# Patient Record
Sex: MALE | Race: BLACK OR AFRICAN AMERICAN | Employment: FULL TIME | URBAN - METROPOLITAN AREA
[De-identification: names, ages, dates, MRNs, and addresses within clinical notes are randomized per-mention and may not be internally consistent; named-entity substitution may affect disease eponyms.]

---

## 2019-05-11 ENCOUNTER — HOSPITAL ENCOUNTER (EMERGENCY)
Age: 24
Discharge: HOME | End: 2019-05-11
Attending: EMERGENCY MEDICINE
Payer: SELF-PAY

## 2019-05-11 VITALS
BODY MASS INDEX: 24.38 KG/M2 | SYSTOLIC BLOOD PRESSURE: 131 MMHG | WEIGHT: 180 LBS | DIASTOLIC BLOOD PRESSURE: 88 MMHG | RESPIRATION RATE: 15 BRPM | OXYGEN SATURATION: 100 % | HEART RATE: 92 BPM | TEMPERATURE: 99 F | HEIGHT: 72 IN

## 2019-05-11 DIAGNOSIS — K29.21 ACUTE ALCOHOLIC GASTRITIS WITH HEMORRHAGE: Primary | ICD-10-CM

## 2019-05-11 LAB
ALBUMIN SERPL-MCNC: 4.4 G/DL (ref 3.4–5)
ALBUMIN/GLOB SERPL: 1.1 {RATIO} (ref 0.8–1.7)
ALP SERPL-CCNC: 76 U/L (ref 45–117)
ALT SERPL-CCNC: 130 U/L (ref 16–61)
ANION GAP SERPL CALC-SCNC: 6 MMOL/L (ref 3–18)
AST SERPL-CCNC: 134 U/L (ref 15–37)
BASOPHILS # BLD: 0 K/UL (ref 0–0.1)
BASOPHILS NFR BLD: 0 % (ref 0–2)
BILIRUB SERPL-MCNC: 0.5 MG/DL (ref 0.2–1)
BUN SERPL-MCNC: 5 MG/DL (ref 7–18)
BUN/CREAT SERPL: 5 (ref 12–20)
CALCIUM SERPL-MCNC: 9.5 MG/DL (ref 8.5–10.1)
CHLORIDE SERPL-SCNC: 103 MMOL/L (ref 100–108)
CO2 SERPL-SCNC: 31 MMOL/L (ref 21–32)
CREAT SERPL-MCNC: 1.1 MG/DL (ref 0.6–1.3)
DIFFERENTIAL METHOD BLD: ABNORMAL
EOSINOPHIL # BLD: 0.2 K/UL (ref 0–0.4)
EOSINOPHIL NFR BLD: 2 % (ref 0–5)
ERYTHROCYTE [DISTWIDTH] IN BLOOD BY AUTOMATED COUNT: 16.5 % (ref 11.6–14.5)
GLOBULIN SER CALC-MCNC: 4.1 G/DL (ref 2–4)
GLUCOSE SERPL-MCNC: 93 MG/DL (ref 74–99)
HCT VFR BLD AUTO: 49.4 % (ref 36–48)
HGB BLD-MCNC: 17.3 G/DL (ref 13–16)
LIPASE SERPL-CCNC: 70 U/L (ref 73–393)
LYMPHOCYTES # BLD: 1.9 K/UL (ref 0.9–3.6)
LYMPHOCYTES NFR BLD: 21 % (ref 21–52)
MCH RBC QN AUTO: 32.8 PG (ref 24–34)
MCHC RBC AUTO-ENTMCNC: 35 G/DL (ref 31–37)
MCV RBC AUTO: 93.6 FL (ref 74–97)
MONOCYTES # BLD: 1.4 K/UL (ref 0.05–1.2)
MONOCYTES NFR BLD: 15 % (ref 3–10)
NEUTS SEG # BLD: 5.7 K/UL (ref 1.8–8)
NEUTS SEG NFR BLD: 62 % (ref 40–73)
PLATELET # BLD AUTO: 202 K/UL (ref 135–420)
PMV BLD AUTO: 9.3 FL (ref 9.2–11.8)
POTASSIUM SERPL-SCNC: 4.7 MMOL/L (ref 3.5–5.5)
PROT SERPL-MCNC: 8.5 G/DL (ref 6.4–8.2)
RBC # BLD AUTO: 5.28 M/UL (ref 4.7–5.5)
SODIUM SERPL-SCNC: 140 MMOL/L (ref 136–145)
WBC # BLD AUTO: 9.2 K/UL (ref 4.6–13.2)

## 2019-05-11 PROCEDURE — 80053 COMPREHEN METABOLIC PANEL: CPT

## 2019-05-11 PROCEDURE — 96375 TX/PRO/DX INJ NEW DRUG ADDON: CPT

## 2019-05-11 PROCEDURE — 85025 COMPLETE CBC W/AUTO DIFF WBC: CPT

## 2019-05-11 PROCEDURE — 83690 ASSAY OF LIPASE: CPT

## 2019-05-11 PROCEDURE — 96374 THER/PROPH/DIAG INJ IV PUSH: CPT

## 2019-05-11 PROCEDURE — C9113 INJ PANTOPRAZOLE SODIUM, VIA: HCPCS | Performed by: PHYSICIAN ASSISTANT

## 2019-05-11 PROCEDURE — 74011250636 HC RX REV CODE- 250/636: Performed by: PHYSICIAN ASSISTANT

## 2019-05-11 PROCEDURE — 99284 EMERGENCY DEPT VISIT MOD MDM: CPT

## 2019-05-11 PROCEDURE — 96361 HYDRATE IV INFUSION ADD-ON: CPT

## 2019-05-11 RX ORDER — OMEPRAZOLE 40 MG/1
40 CAPSULE, DELAYED RELEASE ORAL DAILY
Qty: 30 CAP | Refills: 0 | Status: SHIPPED | OUTPATIENT
Start: 2019-05-11 | End: 2019-08-23

## 2019-05-11 RX ORDER — DIPHENHYDRAMINE HYDROCHLORIDE 50 MG/ML
25 INJECTION, SOLUTION INTRAMUSCULAR; INTRAVENOUS
Status: COMPLETED | OUTPATIENT
Start: 2019-05-11 | End: 2019-05-11

## 2019-05-11 RX ORDER — PANTOPRAZOLE SODIUM 40 MG/10ML
40 INJECTION, POWDER, LYOPHILIZED, FOR SOLUTION INTRAVENOUS
Status: COMPLETED | OUTPATIENT
Start: 2019-05-11 | End: 2019-05-11

## 2019-05-11 RX ORDER — MORPHINE SULFATE 2 MG/ML
2 INJECTION, SOLUTION INTRAMUSCULAR; INTRAVENOUS ONCE
Status: COMPLETED | OUTPATIENT
Start: 2019-05-11 | End: 2019-05-11

## 2019-05-11 RX ORDER — METOCLOPRAMIDE HYDROCHLORIDE 5 MG/ML
5 INJECTION INTRAMUSCULAR; INTRAVENOUS
Status: COMPLETED | OUTPATIENT
Start: 2019-05-11 | End: 2019-05-11

## 2019-05-11 RX ADMIN — DIPHENHYDRAMINE HYDROCHLORIDE 25 MG: 50 INJECTION, SOLUTION INTRAMUSCULAR; INTRAVENOUS at 15:45

## 2019-05-11 RX ADMIN — PANTOPRAZOLE SODIUM 40 MG: 40 INJECTION, POWDER, FOR SOLUTION INTRAVENOUS at 15:43

## 2019-05-11 RX ADMIN — SODIUM CHLORIDE 1000 ML: 900 INJECTION, SOLUTION INTRAVENOUS at 15:42

## 2019-05-11 RX ADMIN — MORPHINE SULFATE 2 MG: 2 INJECTION, SOLUTION INTRAMUSCULAR; INTRAVENOUS at 15:45

## 2019-05-11 RX ADMIN — METOCLOPRAMIDE 5 MG: 5 INJECTION, SOLUTION INTRAMUSCULAR; INTRAVENOUS at 15:43

## 2019-05-11 NOTE — DISCHARGE INSTRUCTIONS
Patient Education        Gastritis: Care Instructions  Your Care Instructions    Gastritis is a sore and upset stomach. It happens when something irritates the stomach lining. Many things can cause it. These include an infection such as the flu or something you ate or drank. Medicines or a sore on the lining of the stomach (ulcer) also can cause it. Your belly may bloat and ache. You may belch, vomit, and feel sick to your stomach. You should be able to relieve the problem by taking medicine. And it may help to change your diet. If gastritis lasts, your doctor may prescribe medicine. Follow-up care is a key part of your treatment and safety. Be sure to make and go to all appointments, and call your doctor if you are having problems. It's also a good idea to know your test results and keep a list of the medicines you take. How can you care for yourself at home? · If your doctor prescribed antibiotics, take them as directed. Do not stop taking them just because you feel better. You need to take the full course of antibiotics. · Be safe with medicines. If your doctor prescribed medicine to decrease stomach acid, take it as directed. Call your doctor if you think you are having a problem with your medicine. · Do not take any other medicine, including over-the-counter pain relievers, without talking to your doctor first.  · If your doctor recommends over-the-counter medicine to reduce stomach acid, such as Pepcid AC, Prilosec, Tagamet HB, or Zantac 75, follow the directions on the label. · Drink plenty of fluids (enough so that your urine is light yellow or clear like water) to prevent dehydration. Choose water and other caffeine-free clear liquids. If you have kidney, heart, or liver disease and have to limit fluids, talk with your doctor before you increase the amount of fluids you drink. · Limit how much alcohol you drink. · Avoid coffee, tea, cola drinks, chocolate, and other foods with caffeine.  They increase stomach acid. When should you call for help? Call 911 anytime you think you may need emergency care. For example, call if:    · You vomit blood or what looks like coffee grounds.     · You pass maroon or very bloody stools.    Call your doctor now or seek immediate medical care if:    · You start breathing fast and have not produced urine in the last 8 hours.     · You cannot keep fluids down.    Watch closely for changes in your health, and be sure to contact your doctor if:    · You do not get better as expected. Where can you learn more? Go to http://nemo-rachna.info/. Enter [id number] in the search box to learn more about \"Gastritis: Care Instructions. \"  Current as of: March 27, 2018  Content Version: 11.9  © 9990-1832 [employer]. Care instructions adapted under license by [employer] (which disclaims liability or warranty for this information). If you have questions about a medical condition or this instruction, always ask your healthcare professional. [address] any warranty or liability for your use of this information.

## 2019-05-11 NOTE — ED PROVIDER NOTES
EMERGENCY DEPARTMENT HISTORY AND PHYSICAL EXAM 
 
Date: 5/11/2019 Patient Name: Chong Mulligan History of Presenting Illness Chief Complaint Patient presents with  Vomiting Blood  Headache History Provided By: Patient Chief Complaint: headache Duration: 5 Days Timing:  Gradual and Worsening Location: head, GI 
Quality: Sharp and Pressure Severity: Severe Modifying Factors: none Associated Symptoms: nausea, vomiting blood, headache, right sided neck pain HPI: Chong Mulligan is a 20 y.o. male with a PMH of hypertension who presents to the ER c/o headache, nausea, vomiting blood and right sided neck pain. Patient states his headache began about 5 days ago and continues to worsen. He tried taking 1 tylenol yesterday w/ no relief. He also reports onset of nausea and 2 episodes of vomiting bright red blood today. After the vomiting, he noted some right sided neck pain. He admits to drinking several beers and a few shots of liquor last night. He denied any recent fevers, sick contacts and has no other symptoms or complaints. PCP: Odalys Ybarra MD 
 
Current Outpatient Medications Medication Sig Dispense Refill  omeprazole (PRILOSEC) 40 mg capsule Take 1 Cap by mouth daily. 30 Cap 0 Past History Past Medical History: 
Past Medical History:  
Diagnosis Date  Hypertension Past Surgical History: 
History reviewed. No pertinent surgical history. Family History: 
History reviewed. No pertinent family history. Social History: 
Social History Tobacco Use  Smoking status: Never Smoker  Smokeless tobacco: Never Used Substance Use Topics  Alcohol use: Not on file  Drug use: Not on file Allergies: 
No Known Allergies Review of Systems Review of Systems Constitutional: Negative for chills, fatigue and fever. HENT: Negative. Negative for sore throat. Eyes: Negative. Negative for photophobia. Respiratory: Negative for cough and shortness of breath. Cardiovascular: Negative for chest pain and palpitations. Gastrointestinal: Positive for abdominal pain, nausea and vomiting. Vomiting bright red blood Genitourinary: Negative for dysuria. Musculoskeletal: Positive for neck pain. Skin: Negative. Neurological: Positive for headaches. Negative for dizziness, weakness and light-headedness. Psychiatric/Behavioral: Negative. All other systems reviewed and are negative. Physical Exam  
 
Vitals:  
 05/11/19 1600 05/11/19 1615 05/11/19 1630 05/11/19 1645 BP: 129/77 137/83 127/69 131/88 Pulse:      
Resp:      
Temp:      
SpO2: 100% 100% 100% 100% Weight:      
Height:      
 
Physical Exam  
Constitutional: He is oriented to person, place, and time. He appears well-developed and well-nourished. No distress. HENT:  
Head: Normocephalic and atraumatic. Mouth/Throat: Oropharynx is clear and moist.  
Eyes: Conjunctivae are normal. No scleral icterus. Neck: Neck supple. No JVD present. No tracheal deviation present. No Brudzinski's sign and no Kernig's sign noted. Cardiovascular: Normal rate, regular rhythm and normal heart sounds. No murmur heard. Pulmonary/Chest: Effort normal and breath sounds normal. No respiratory distress. He has no wheezes. He has no rales. He exhibits no tenderness. Abdominal: Soft. Bowel sounds are normal. He exhibits no distension. There is no tenderness. There is no rebound and no guarding. Musculoskeletal: Normal range of motion. Neurological: He is alert and oriented to person, place, and time. He has normal strength. Gait normal. GCS eye subscore is 4. GCS verbal subscore is 5. GCS motor subscore is 6. Skin: Skin is warm and dry. He is not diaphoretic. Psychiatric: He has a normal mood and affect. Nursing note and vitals reviewed. Diagnostic Study Results Labs - Recent Results (from the past 12 hour(s)) CBC WITH AUTOMATED DIFF Collection Time: 05/11/19  3:25 PM  
Result Value Ref Range WBC 9.2 4.6 - 13.2 K/uL  
 RBC 5.28 4.70 - 5.50 M/uL  
 HGB 17.3 (H) 13.0 - 16.0 g/dL HCT 49.4 (H) 36.0 - 48.0 % MCV 93.6 74.0 - 97.0 FL  
 MCH 32.8 24.0 - 34.0 PG  
 MCHC 35.0 31.0 - 37.0 g/dL  
 RDW 16.5 (H) 11.6 - 14.5 % PLATELET 592 702 - 580 K/uL MPV 9.3 9.2 - 11.8 FL  
 NEUTROPHILS 62 40 - 73 % LYMPHOCYTES 21 21 - 52 % MONOCYTES 15 (H) 3 - 10 % EOSINOPHILS 2 0 - 5 % BASOPHILS 0 0 - 2 %  
 ABS. NEUTROPHILS 5.7 1.8 - 8.0 K/UL  
 ABS. LYMPHOCYTES 1.9 0.9 - 3.6 K/UL  
 ABS. MONOCYTES 1.4 (H) 0.05 - 1.2 K/UL  
 ABS. EOSINOPHILS 0.2 0.0 - 0.4 K/UL  
 ABS. BASOPHILS 0.0 0.0 - 0.1 K/UL  
 DF AUTOMATED METABOLIC PANEL, COMPREHENSIVE Collection Time: 05/11/19  3:25 PM  
Result Value Ref Range Sodium 140 136 - 145 mmol/L Potassium 4.7 3.5 - 5.5 mmol/L Chloride 103 100 - 108 mmol/L  
 CO2 31 21 - 32 mmol/L Anion gap 6 3.0 - 18 mmol/L Glucose 93 74 - 99 mg/dL BUN 5 (L) 7.0 - 18 MG/DL Creatinine 1.10 0.6 - 1.3 MG/DL  
 BUN/Creatinine ratio 5 (L) 12 - 20 GFR est AA >60 >60 ml/min/1.73m2 GFR est non-AA >60 >60 ml/min/1.73m2 Calcium 9.5 8.5 - 10.1 MG/DL Bilirubin, total 0.5 0.2 - 1.0 MG/DL  
 ALT (SGPT) 130 (H) 16 - 61 U/L  
 AST (SGOT) 134 (H) 15 - 37 U/L Alk. phosphatase 76 45 - 117 U/L Protein, total 8.5 (H) 6.4 - 8.2 g/dL Albumin 4.4 3.4 - 5.0 g/dL Globulin 4.1 (H) 2.0 - 4.0 g/dL A-G Ratio 1.1 0.8 - 1.7 LIPASE Collection Time: 05/11/19  3:25 PM  
Result Value Ref Range Lipase 70 (L) 73 - 393 U/L Radiologic Studies - No orders to display CT Results  (Last 48 hours) None CXR Results  (Last 48 hours) None Medical Decision Making I am the first provider for this patient. I reviewed the vital signs, available nursing notes, past medical history, past surgical history, family history and social history. Vital Signs-Reviewed the patient's vital signs. Records Reviewed: Nursing Notes and Old Medical Records 3:42 PM 
19 y/o male who presents to the ER c/o headache x 5 days, neck pain that began today after vomiting. Also reports N/V that began today. 2 episodes of emesis w/ bright red blood. Admits to moderate to heavy alcohol intake last night. No previous episodes similar to today in the past.  Well appearing on exam.  Afebrile w/ no meningeal signs. Will plan on labs, meds for symptom relief at this time. Guillermo Sterling PA-C 
  
5:14 PM 
Patient resting at this time with moderate relief in his symptoms. No further neck pain. Headache has resolved. No further nausea or vomiting since arrival in the ER. Discussed all results with patient. Symptoms most likely consistent with alcoholic gastritis. We will plan on GI follow-up as needed discussed return precautions. Advised patient to see his primary care this week. All questions answered and patient in agreement with plan of care. Will plan for discharge. Guillermo Sterling PA-C Disposition: 
discharged DISCHARGE NOTE:  
 
  Care plan outlined and precautions discussed. Patient has no new complaints, changes, or physical findings. Results of labs were reviewed with the patient. All medications were reviewed with the patient; will d/c home with omeprazole. All of pt's questions and concerns were addressed. Patient was instructed and agrees to follow up with pcp and GI, as well as to return to the ED upon further deterioration. Patient is ready to go home. Follow-up Information Follow up With Specialties Details Why Contact Info [address] EMERGENCY DEPT Emergency Medicine  If symptoms worsen [address] 
[phone] Aliza Hoffmann MD Family Practice Call in 3 days ER follow up for nausea, vomiting, headache [address] [facility] [address] [id number] [phone] [facility]  Call in 1 week GI follow up for nausea, vomiting [id number] [facility] [address] [place] [address]) [phone] Current Discharge Medication List  
  
START taking these medications Details  
omeprazole (PRILOSEC) 40 mg capsule Take 1 Cap by mouth daily. Qty: 30 Cap, Refills: 0 Provider Notes (Medical Decision Making):  
 
Procedures: 
Procedures Diagnosis Clinical Impression: 1. Acute alcoholic gastritis with hemorrhage

## 2019-05-11 NOTE — ED TRIAGE NOTES
5 days of headache. Getting worse. Started vomiting blood yesterday. Right side of neck became stiff.

## 2019-08-23 ENCOUNTER — HOSPITAL ENCOUNTER (EMERGENCY)
Age: 24
Discharge: HOME | End: 2019-08-24
Attending: EMERGENCY MEDICINE | Admitting: EMERGENCY MEDICINE
Payer: SELF-PAY

## 2019-08-23 ENCOUNTER — APPOINTMENT (OUTPATIENT)
Dept: GENERAL RADIOLOGY | Age: 24
End: 2019-08-23
Attending: PHYSICIAN ASSISTANT
Payer: SELF-PAY

## 2019-08-23 DIAGNOSIS — E86.0 DEHYDRATION: ICD-10-CM

## 2019-08-23 DIAGNOSIS — R50.9 FEVER, UNSPECIFIED FEVER CAUSE: Primary | ICD-10-CM

## 2019-08-23 DIAGNOSIS — M62.82 NON-TRAUMATIC RHABDOMYOLYSIS: ICD-10-CM

## 2019-08-23 LAB
ALBUMIN SERPL-MCNC: 3.4 G/DL (ref 3.4–5)
ALBUMIN/GLOB SERPL: 0.9 {RATIO} (ref 0.8–1.7)
ALP SERPL-CCNC: 81 U/L (ref 45–117)
ALT SERPL-CCNC: 179 U/L (ref 16–61)
ANION GAP SERPL CALC-SCNC: 6 MMOL/L (ref 3–18)
APPEARANCE UR: CLEAR
AST SERPL-CCNC: 200 U/L (ref 10–38)
BASOPHILS # BLD: 0 K/UL (ref 0–0.1)
BASOPHILS NFR BLD: 0 % (ref 0–3)
BILIRUB SERPL-MCNC: 0.6 MG/DL (ref 0.2–1)
BILIRUB UR QL: NEGATIVE
BUN SERPL-MCNC: 9 MG/DL (ref 7–18)
BUN/CREAT SERPL: 8 (ref 12–20)
CALCIUM SERPL-MCNC: 8.3 MG/DL (ref 8.5–10.1)
CHLORIDE SERPL-SCNC: 98 MMOL/L (ref 100–111)
CK MB CFR SERPL CALC: 0.3 % (ref 0–4)
CK MB SERPL-MCNC: 6.3 NG/ML (ref 5–25)
CK SERPL-CCNC: 2199 U/L (ref 39–308)
CO2 SERPL-SCNC: 30 MMOL/L (ref 21–32)
COLOR UR: YELLOW
CREAT SERPL-MCNC: 1.09 MG/DL (ref 0.6–1.3)
DIFFERENTIAL METHOD BLD: ABNORMAL
EOSINOPHIL # BLD: 0 K/UL (ref 0–0.4)
EOSINOPHIL NFR BLD: 0 % (ref 0–5)
ERYTHROCYTE [DISTWIDTH] IN BLOOD BY AUTOMATED COUNT: 15.2 % (ref 11.6–14.5)
GLOBULIN SER CALC-MCNC: 3.7 G/DL (ref 2–4)
GLUCOSE SERPL-MCNC: 90 MG/DL (ref 74–99)
GLUCOSE UR STRIP.AUTO-MCNC: NEGATIVE MG/DL
HCT VFR BLD AUTO: 44.5 % (ref 36–48)
HGB BLD-MCNC: 15.7 G/DL (ref 13–16)
HGB UR QL STRIP: NEGATIVE
KETONES UR QL STRIP.AUTO: NEGATIVE MG/DL
LACTATE BLD-SCNC: 1.03 MMOL/L (ref 0.4–2)
LEUKOCYTE ESTERASE UR QL STRIP.AUTO: NEGATIVE
LYMPHOCYTES # BLD: 10.1 K/UL (ref 0.8–3.5)
LYMPHOCYTES NFR BLD: 66 % (ref 20–51)
MCH RBC QN AUTO: 32 PG (ref 24–34)
MCHC RBC AUTO-ENTMCNC: 35.3 G/DL (ref 31–37)
MCV RBC AUTO: 90.6 FL (ref 74–97)
MONOCYTES # BLD: 1.1 K/UL (ref 0–1)
MONOCYTES NFR BLD: 7 % (ref 2–9)
NEUTS SEG # BLD: 4.1 K/UL (ref 1.8–8)
NEUTS SEG NFR BLD: 27 % (ref 42–75)
NITRITE UR QL STRIP.AUTO: NEGATIVE
PH UR STRIP: 6 [PH] (ref 5–8)
PLATELET # BLD AUTO: 293 K/UL (ref 135–420)
PLATELET COMMENTS,PCOM: ADEQUATE
PMV BLD AUTO: 9.5 FL (ref 9.2–11.8)
POTASSIUM SERPL-SCNC: 4 MMOL/L (ref 3.5–5.5)
PROT SERPL-MCNC: 7.1 G/DL (ref 6.4–8.2)
PROT UR STRIP-MCNC: NEGATIVE MG/DL
RBC # BLD AUTO: 4.91 M/UL (ref 4.7–5.5)
RBC MORPH BLD: ABNORMAL
SODIUM SERPL-SCNC: 134 MMOL/L (ref 136–145)
SP GR UR REFRACTOMETRY: 1.01 (ref 1–1.03)
TROPONIN I SERPL-MCNC: 0.05 NG/ML (ref 0–0.04)
UROBILINOGEN UR QL STRIP.AUTO: 2 EU/DL (ref 0.2–1)
WBC # BLD AUTO: 15.3 K/UL (ref 4.6–13.2)

## 2019-08-23 PROCEDURE — 82550 ASSAY OF CK (CPK): CPT

## 2019-08-23 PROCEDURE — 85025 COMPLETE CBC W/AUTO DIFF WBC: CPT

## 2019-08-23 PROCEDURE — 93005 ELECTROCARDIOGRAM TRACING: CPT

## 2019-08-23 PROCEDURE — 80053 COMPREHEN METABOLIC PANEL: CPT

## 2019-08-23 PROCEDURE — 74011250636 HC RX REV CODE- 250/636: Performed by: PHYSICIAN ASSISTANT

## 2019-08-23 PROCEDURE — 87389 HIV-1 AG W/HIV-1&-2 AB AG IA: CPT

## 2019-08-23 PROCEDURE — 81003 URINALYSIS AUTO W/O SCOPE: CPT

## 2019-08-23 PROCEDURE — 80074 ACUTE HEPATITIS PANEL: CPT

## 2019-08-23 PROCEDURE — 74011250637 HC RX REV CODE- 250/637: Performed by: PHYSICIAN ASSISTANT

## 2019-08-23 PROCEDURE — 99285 EMERGENCY DEPT VISIT HI MDM: CPT

## 2019-08-23 PROCEDURE — 83605 ASSAY OF LACTIC ACID: CPT

## 2019-08-23 PROCEDURE — 71046 X-RAY EXAM CHEST 2 VIEWS: CPT

## 2019-08-23 RX ORDER — IBUPROFEN 400 MG/1
800 TABLET ORAL
Status: COMPLETED | OUTPATIENT
Start: 2019-08-23 | End: 2019-08-23

## 2019-08-23 RX ADMIN — IBUPROFEN 800 MG: 400 TABLET ORAL at 20:27

## 2019-08-23 RX ADMIN — SODIUM CHLORIDE 1000 ML: 900 INJECTION, SOLUTION INTRAVENOUS at 20:27

## 2019-08-23 RX ADMIN — SODIUM CHLORIDE 1000 ML: 900 INJECTION, SOLUTION INTRAVENOUS at 22:08

## 2019-08-23 NOTE — LETTER
NOTIFICATION OF RETURN TO WORK / SCHOOL 
 
8/24/2019 Mr. Lottie Sargent [address] [phone] To Whom it may concern, Please excuse Lottie Sargent from work 8/27/19 for medical reasons.    
 
 
 
 
 
 
Sincerely,  
 
 
 
 
Wilfred Askew PA-C

## 2019-08-23 NOTE — ED TRIAGE NOTES
Fever x10 days  Lowest 99 highest 103.1   Muscle twitching/hurt/tight. Night sweats. No cough.   No n/v/d

## 2019-08-24 VITALS
TEMPERATURE: 100 F | OXYGEN SATURATION: 98 % | DIASTOLIC BLOOD PRESSURE: 53 MMHG | SYSTOLIC BLOOD PRESSURE: 96 MMHG | HEART RATE: 77 BPM | RESPIRATION RATE: 11 BRPM

## 2019-08-24 LAB
ATRIAL RATE: 114 BPM
CALCULATED P AXIS, ECG09: 62 DEGREES
CALCULATED R AXIS, ECG10: 55 DEGREES
CALCULATED T AXIS, ECG11: 43 DEGREES
CK MB CFR SERPL CALC: 0.4 % (ref 0–4)
CK MB SERPL-MCNC: 6.9 NG/ML (ref 5–25)
CK SERPL-CCNC: 1946 U/L (ref 39–308)
DIAGNOSIS, 93000: NORMAL
P-R INTERVAL, ECG05: 130 MS
Q-T INTERVAL, ECG07: 314 MS
QRS DURATION, ECG06: 86 MS
QTC CALCULATION (BEZET), ECG08: 432 MS
TROPONIN I SERPL-MCNC: 0.06 NG/ML (ref 0–0.04)
VENTRICULAR RATE, ECG03: 114 BPM

## 2019-08-24 NOTE — DISCHARGE INSTRUCTIONS
Drink large amounts of water every day. Follow up with PCP on Monday to recheck labs; if you cannot get appointment, return to ED for reassessment. Return to ED for any new or worsening symptoms. Patient Education        Rhabdomyolysis: Care Instructions  Your Care Instructions    When you have rhabdomyolysis (say \"uer-lrh-xu-AH-diego-tao\"), dying muscle cells cause toxins to build up in the blood. If not treated, it can cause life-threatening damage to the body's organs. It can be caused by many things, such as severe muscle injury, some medicines (like statins), the flu, and certain blood infections. Symptoms may include weak muscles, pain, stiffness, fever, and nausea. Your urine may also be dark. You will get treatment in the hospital. If possible, the doctor will stop the cause of muscle cell death. The doctor will take steps to protect your organs. You may have to stop taking certain medicines if they are the cause of the problem. You will also get treatment to help the kidneys remove the toxins from your blood. This includes plenty of fluids. You may get fluids through a vein (by IV). You may also need dialysis. Follow-up care is a key part of your treatment and safety. Be sure to make and go to all appointments, and call your doctor if you are having problems. It's also a good idea to know your test results and keep a list of the medicines you take. How can you care for yourself at home? · Take pain medicines exactly as directed. ? If the doctor gave you a prescription medicine for pain, take it as prescribed. ? If you are not taking a prescription pain medicine, ask your doctor if you can take an over-the-counter medicine. · Talk to your doctor about whether you need to stop taking any medicines. Follow your doctor's instructions about stopping medicines. · Drink plenty of fluids, enough so that your urine is light yellow or clear like water.  If you have kidney, heart, or liver disease and have to limit fluids, talk with your doctor before you increase the amount of fluids you drink. When should you call for help? Call your doctor now or seek immediate medical care if:    · You have new or worse muscle pain.     · You have less urine than normal or no urine.     · You have new swelling in your arms or feet.     · You have blood in your urine.    Watch closely for changes in your health, and be sure to contact your doctor if you do not get better as expected. Where can you learn more? Go to http://nemo-rachna.info/. Enter F129 in the search box to learn more about \"Rhabdomyolysis: Care Instructions. \"  Current as of: October 31, 2018  Content Version: 12.1  © 8740-9546 [employer]. Care instructions adapted under license by [employer] (which disclaims liability or warranty for this information). If you have questions about a medical condition or this instruction, always ask your healthcare professional. [place] [address] any warranty or liability for your use of this information.

## 2019-08-24 NOTE — ED PROVIDER NOTES
EMERGENCY DEPARTMENT HISTORY AND PHYSICAL EXAM    10:16 PM      Date: 8/23/2019  Patient Name: Marilyn Barrios    History of Presenting Illness     Chief Complaint   Patient presents with    Fever    Irregular Heart Beat    Muscle Pain         History Provided By: Patient    Chief Complaint: fever       Additional History (Context): Marilyn Barrios is a 20 y.o. male with hypertension who presents with fever for the past 10 days associated with muscle aches, joint pains, lower back pain and feeling of heart racing. He has been taking ibuprofen for the symptoms. He denies any sick contacts. He denies cough, abdominal pain, diarrhea, nausea, vomiting, dysuria, penile discharge, testicular pain, headache. He has no other symptoms of infection. He is also been taking NyQuil without relief. He denies IV drug use. Currently pain is 0 out of 10. No recent hospital admissions or incarcerations. Dakota Gage PCP: Tuan Padron MD        Past History     Past Medical History:  Past Medical History:   Diagnosis Date    Hypertension        Past Surgical History:  History reviewed. No pertinent surgical history. Family History:  History reviewed. No pertinent family history. Social History:  Social History     Tobacco Use    Smoking status: Never Smoker    Smokeless tobacco: Never Used   Substance Use Topics    Alcohol use: Not on file    Drug use: Not on file       Allergies:  No Known Allergies      Review of Systems       Review of Systems   Constitutional: Positive for diaphoresis and fever. HENT: Negative for facial swelling. Eyes: Negative for visual disturbance. Respiratory: Negative for shortness of breath. Cardiovascular: Negative for chest pain. Gastrointestinal: Negative for abdominal pain. Genitourinary: Negative for dysuria. Musculoskeletal: Positive for arthralgias, back pain and myalgias. Negative for neck pain. Skin: Negative for rash. Neurological: Negative for dizziness. Psychiatric/Behavioral: Negative for confusion. All other systems reviewed and are negative. Physical Exam     Visit Vitals  /76   Pulse 93   Temp 100 °F (37.8 °C)   Resp 20   SpO2 96%         Physical Exam   Constitutional: He is oriented to person, place, and time. He appears well-developed and well-nourished. No distress. HENT:   Head: Normocephalic and atraumatic. Right Ear: Tympanic membrane, external ear and ear canal normal.   Left Ear: Tympanic membrane, external ear and ear canal normal.   Nose: Nose normal. Right sinus exhibits no maxillary sinus tenderness and no frontal sinus tenderness. Left sinus exhibits no maxillary sinus tenderness and no frontal sinus tenderness. Mouth/Throat: Uvula is midline, oropharynx is clear and moist and mucous membranes are normal. No trismus in the jaw. No uvula swelling. No oropharyngeal exudate, posterior oropharyngeal edema, posterior oropharyngeal erythema or tonsillar abscesses. Eyes: Conjunctivae are normal.   Neck: Normal range of motion. Neck supple. Cardiovascular: Normal rate, regular rhythm and normal heart sounds. Pulmonary/Chest: Effort normal and breath sounds normal. He has no wheezes. He has no rales. Abdominal: Soft. There is no tenderness. Musculoskeletal: Normal range of motion. Mild lower back tenderness, diffuse    Neurological: He is alert and oriented to person, place, and time. Skin: Skin is warm and dry. He is not diaphoretic. Psychiatric: He has a normal mood and affect. Nursing note and vitals reviewed.         Diagnostic Study Results     Labs -  Recent Results (from the past 12 hour(s))   EKG, 12 LEAD, INITIAL    Collection Time: 08/23/19  5:51 PM   Result Value Ref Range    Ventricular Rate 114 BPM    Atrial Rate 114 BPM    P-R Interval 130 ms    QRS Duration 86 ms    Q-T Interval 314 ms    QTC Calculation (Bezet) 432 ms    Calculated P Axis 62 degrees    Calculated R Axis 55 degrees    Calculated T Axis 43 degrees    Diagnosis       Sinus tachycardia  Otherwise normal ECG  No previous ECGs available     CBC WITH AUTOMATED DIFF    Collection Time: 08/23/19  8:00 PM   Result Value Ref Range    WBC 15.3 (H) 4.6 - 13.2 K/uL    RBC 4.91 4.70 - 5.50 M/uL    HGB 15.7 13.0 - 16.0 g/dL    HCT 44.5 36.0 - 48.0 %    MCV 90.6 74.0 - 97.0 FL    MCH 32.0 24.0 - 34.0 PG    MCHC 35.3 31.0 - 37.0 g/dL    RDW 15.2 (H) 11.6 - 14.5 %    PLATELET 238 063 - 950 K/uL    MPV 9.5 9.2 - 11.8 FL    NEUTROPHILS 27 (L) 42 - 75 %    LYMPHOCYTES 66 (H) 20 - 51 %    MONOCYTES 7 2 - 9 %    EOSINOPHILS 0 0 - 5 %    BASOPHILS 0 0 - 3 %    ABS. NEUTROPHILS 4.1 1.8 - 8.0 K/UL    ABS. LYMPHOCYTES 10.1 (H) 0.8 - 3.5 K/UL    ABS. MONOCYTES 1.1 (H) 0 - 1.0 K/UL    ABS. EOSINOPHILS 0.0 0.0 - 0.4 K/UL    ABS. BASOPHILS 0.0 0.0 - 0.1 K/UL    PLATELET COMMENTS ADEQUATE      RBC COMMENTS ANISOCYTOSIS  1+        DF MANUAL     METABOLIC PANEL, COMPREHENSIVE    Collection Time: 08/23/19  8:00 PM   Result Value Ref Range    Sodium 134 (L) 136 - 145 mmol/L    Potassium 4.0 3.5 - 5.5 mmol/L    Chloride 98 (L) 100 - 111 mmol/L    CO2 30 21 - 32 mmol/L    Anion gap 6 3.0 - 18 mmol/L    Glucose 90 74 - 99 mg/dL    BUN 9 7.0 - 18 MG/DL    Creatinine 1.09 0.6 - 1.3 MG/DL    BUN/Creatinine ratio 8 (L) 12 - 20      GFR est AA >60 >60 ml/min/1.73m2    GFR est non-AA >60 >60 ml/min/1.73m2    Calcium 8.3 (L) 8.5 - 10.1 MG/DL    Bilirubin, total 0.6 0.2 - 1.0 MG/DL    ALT (SGPT) 179 (H) 16 - 61 U/L    AST (SGOT) 200 (H) 10 - 38 U/L    Alk.  phosphatase 81 45 - 117 U/L    Protein, total 7.1 6.4 - 8.2 g/dL    Albumin 3.4 3.4 - 5.0 g/dL    Globulin 3.7 2.0 - 4.0 g/dL    A-G Ratio 0.9 0.8 - 1.7     CARDIAC PANEL,(CK, CKMB & TROPONIN)    Collection Time: 08/23/19  8:00 PM   Result Value Ref Range    CK 2,199 (H) 39 - 308 U/L    CK - MB 6.3 (H) <3.6 ng/ml    CK-MB Index 0.3 0.0 - 4.0 %    Troponin-I, QT 0.05 (H) 0.0 - 0.045 NG/ML   POC LACTIC ACID    Collection Time: 08/23/19  9:04 PM   Result Value Ref Range    Lactic Acid (POC) 1.03 0.40 - 2.00 mmol/L   URINALYSIS W/ RFLX MICROSCOPIC    Collection Time: 08/23/19 10:07 PM   Result Value Ref Range    Color YELLOW      Appearance CLEAR      Specific gravity 1.010 1.005 - 1.030      pH (UA) 6.0 5.0 - 8.0      Protein NEGATIVE  NEG mg/dL    Glucose NEGATIVE  NEG mg/dL    Ketone NEGATIVE  NEG mg/dL    Bilirubin NEGATIVE  NEG      Blood NEGATIVE  NEG      Urobilinogen 2.0 (H) 0.2 - 1.0 EU/dL    Nitrites NEGATIVE  NEG      Leukocyte Esterase NEGATIVE  NEG     CARDIAC PANEL,(CK, CKMB & TROPONIN)    Collection Time: 08/23/19 11:38 PM   Result Value Ref Range    CK 1,946 (H) 39 - 308 U/L    CK - MB 6.9 (H) <3.6 ng/ml    CK-MB Index 0.4 0.0 - 4.0 %    Troponin-I, QT 0.06 (H) 0.0 - 0.045 NG/ML       Radiologic Studies -   XR CHEST PA LAT    (Results Pending)         Medical Decision Making   I am the first provider for this patient. I reviewed the vital signs, available nursing notes, past medical history, past surgical history, family history and social history. Vital Signs-Reviewed the patient's vital signs. Records Reviewed: Nursing Notes (Time of Review: 10:16 PM)    ED Course: Progress Notes, Reevaluation, and Consults:      Provider Notes (Medical Decision Making): MDM  Number of Diagnoses or Management Options  Dehydration:   Fever, unspecified fever cause:   Non-traumatic rhabdomyolysis:   Diagnosis management comments: 22yo M with c/o fever x 10 days a/w body aches. No other symptoms of infection. Mild elevation in temp and HR. Patient is young, healthy, 21 yo with no concerning comorbidities and no symptoms of infection; lactic is normal, mild elevation of WBC; I Do not feel that this warrants sepsis work up. LFTs are mildly elevated, which has been present in the past.  He has no abdominal tenderness on exam or GI symptoms. This can be worked up outpatient.   Mild rhabdo, CK of 2,000- treated with IV fluids; kidney function is normal.         12:47 AM  Improvement in CK, no significant change in troponin. Troponin is minimally elevated- not significant, no chest pain or SOB to suggest PE, myocarditis, pericarditis. Back pain is minimal- not clinically suggestive of abscess. CXR negative. UA negative. No obvious source for infection. Will send off HIV and hepatitis panel. Continue oral hydration at home. No working in the heat until follow up. See PCP in 2 days, or return to ER to recheck labs if unable to be seen. Patient understands and has proper follow up with PCP. Discussed treatment plan, return precautions, symptomatic relief, and expected time to improvement. All questions answered. Patient is stable for discharge and outpatient management. Diagnosis     Clinical Impression:   1. Fever, unspecified fever cause    2. Non-traumatic rhabdomyolysis    3. Dehydration        Disposition: Discharged      Follow-up Information     Follow up With Specialties Details Why Contact Info    German Mullins MD Family Practice In 2 days  [address] [facility] [address]  [phone]      [address] EMERGENCY DEPT Emergency Medicine  Immediately if symptoms worsen [address] [facility]  [phone]           Patient's Medications   Start Taking    No medications on file   Continue Taking    No medications on file   These Medications have changed    No medications on file   Stop Taking    OMEPRAZOLE (PRILOSEC) 40 MG CAPSULE    Take 1 Cap by mouth daily. _______________________________    Attestations:  Scribe Attestation     Wilfred Askew PA-C acting as a scribe for and in the presence of MARLYS/JAZLYN Garnica      August 24, 2019 at 12:51 AM       Provider Attestation:      I personally performed the services described in the documentation, reviewed the documentation, as recorded by the scribe in my presence, and it accurately and completely records my words and actions.  August 24, 2019 at 12:51 DONG Askew, JAZLYN  _______________________________

## 2019-08-24 NOTE — ED NOTES
Assume care of patient at this time. Patient states that he has been experiencing fevers and generalized body aches x 10 days. Patient A&Ox4, ambulatory. patient placed on cardiac monitor and continuous pulse ox. Denies SOB or chest pain. Breathing even and unlabored. States pain is 3/10.  Will continue to monitor

## 2019-08-26 ENCOUNTER — HOSPITAL ENCOUNTER (EMERGENCY)
Age: 24
Discharge: HOME | End: 2019-08-26
Attending: EMERGENCY MEDICINE | Admitting: EMERGENCY MEDICINE
Payer: SELF-PAY

## 2019-08-26 VITALS
HEIGHT: 71 IN | RESPIRATION RATE: 18 BRPM | BODY MASS INDEX: 25.9 KG/M2 | WEIGHT: 185 LBS | DIASTOLIC BLOOD PRESSURE: 81 MMHG | TEMPERATURE: 99.8 F | OXYGEN SATURATION: 100 % | HEART RATE: 100 BPM | SYSTOLIC BLOOD PRESSURE: 128 MMHG

## 2019-08-26 DIAGNOSIS — R50.9 FEVER, UNSPECIFIED FEVER CAUSE: Primary | ICD-10-CM

## 2019-08-26 LAB
ALBUMIN SERPL-MCNC: 3.3 G/DL (ref 3.4–5)
ALBUMIN/GLOB SERPL: 0.9 {RATIO} (ref 0.8–1.7)
ALP SERPL-CCNC: 82 U/L (ref 45–117)
ALT SERPL-CCNC: 271 U/L (ref 16–61)
ANION GAP SERPL CALC-SCNC: 5 MMOL/L (ref 3–18)
AST SERPL-CCNC: 256 U/L (ref 10–38)
BASOPHILS # BLD: 0 K/UL (ref 0–0.1)
BASOPHILS NFR BLD: 0 % (ref 0–3)
BILIRUB SERPL-MCNC: 0.8 MG/DL (ref 0.2–1)
BUN SERPL-MCNC: 4 MG/DL (ref 7–18)
BUN/CREAT SERPL: 4 (ref 12–20)
CALCIUM SERPL-MCNC: 8.4 MG/DL (ref 8.5–10.1)
CHLORIDE SERPL-SCNC: 104 MMOL/L (ref 100–111)
CK MB CFR SERPL CALC: 0.6 % (ref 0–4)
CK MB SERPL-MCNC: 11 NG/ML (ref 5–25)
CK SERPL-CCNC: 1990 U/L (ref 39–308)
CO2 SERPL-SCNC: 29 MMOL/L (ref 21–32)
CREAT SERPL-MCNC: 0.94 MG/DL (ref 0.6–1.3)
DIFFERENTIAL METHOD BLD: ABNORMAL
EOSINOPHIL # BLD: 0 K/UL (ref 0–0.4)
EOSINOPHIL NFR BLD: 0 % (ref 0–5)
ERYTHROCYTE [DISTWIDTH] IN BLOOD BY AUTOMATED COUNT: 15.3 % (ref 11.6–14.5)
GLOBULIN SER CALC-MCNC: 3.7 G/DL (ref 2–4)
GLUCOSE SERPL-MCNC: 100 MG/DL (ref 74–99)
HAV IGM SER QL: NEGATIVE
HBV CORE IGM SER QL: NEGATIVE
HBV SURFACE AG SER QL: <0.1 INDEX
HBV SURFACE AG SER QL: NEGATIVE
HCT VFR BLD AUTO: 44.3 % (ref 36–48)
HCV AB SER IA-ACNC: 0.08 INDEX
HCV AB SERPL QL IA: NEGATIVE
HCV COMMENT,HCGAC: NORMAL
HGB BLD-MCNC: 15.5 G/DL (ref 13–16)
HIV 1+2 AB+HIV1 P24 AG SERPL QL IA: NONREACTIVE
HIV12 RESULT COMMENT, HHIVC: NORMAL
LYMPHOCYTES # BLD: 9.2 K/UL (ref 0.8–3.5)
LYMPHOCYTES NFR BLD: 58 % (ref 20–51)
MCH RBC QN AUTO: 31.6 PG (ref 24–34)
MCHC RBC AUTO-ENTMCNC: 35 G/DL (ref 31–37)
MCV RBC AUTO: 90.4 FL (ref 74–97)
MONOCYTES # BLD: 1.3 K/UL (ref 0–1)
MONOCYTES NFR BLD: 8 % (ref 2–9)
NEUTS SEG # BLD: 5.4 K/UL (ref 1.8–8)
NEUTS SEG NFR BLD: 34 % (ref 42–75)
PLATELET # BLD AUTO: 309 K/UL (ref 135–420)
PMV BLD AUTO: 9.1 FL (ref 9.2–11.8)
POTASSIUM SERPL-SCNC: 4.4 MMOL/L (ref 3.5–5.5)
PROT SERPL-MCNC: 7 G/DL (ref 6.4–8.2)
RBC # BLD AUTO: 4.9 M/UL (ref 4.7–5.5)
RBC MORPH BLD: ABNORMAL
SODIUM SERPL-SCNC: 138 MMOL/L (ref 136–145)
SP1: NORMAL
SP2: NORMAL
SP3: NORMAL
TROPONIN I SERPL-MCNC: 0.03 NG/ML (ref 0–0.04)
WBC # BLD AUTO: 15.9 K/UL (ref 4.6–13.2)

## 2019-08-26 PROCEDURE — 93005 ELECTROCARDIOGRAM TRACING: CPT

## 2019-08-26 PROCEDURE — 80053 COMPREHEN METABOLIC PANEL: CPT

## 2019-08-26 PROCEDURE — 99285 EMERGENCY DEPT VISIT HI MDM: CPT

## 2019-08-26 PROCEDURE — 85025 COMPLETE CBC W/AUTO DIFF WBC: CPT

## 2019-08-26 PROCEDURE — 82550 ASSAY OF CK (CPK): CPT

## 2019-08-26 NOTE — ED NOTES
Pt  States he was seen on Friday with back pain, fever,and irregular heart beat. Pt states he was told to follow up on Monday with PCP and if not to return to ED.   Pt states he was unable to get an appointment

## 2019-08-26 NOTE — DISCHARGE INSTRUCTIONS
Patient Education        Learning About Fever  What is a fever? A fever is a high body temperature. It's one way your body fights being sick. A fever shows that the body is responding to infection or other illnesses, both minor and severe. A fever is a symptom, not an illness by itself. A fever can be a sign that you are ill, but most fevers are not caused by a serious problem. You may have a fever with a minor illness, such as a cold. But sometimes a very serious infection may cause little or no fever. It is important to look at other symptoms, other conditions you have, and how you feel in general. In children, notice how they act and see what symptoms they complain of. What is a normal body temperature? A normal body temperature is about 98. 6ºF. Some people have a normal temperature that is a little higher or a little lower than this. Your temperature may be a little lower in the morning than it is later in the day. It may go up during hot weather or when you exercise, wear heavy clothes, or take a hot bath. Your temperature may also be different depending on how you take it. A temperature taken in the mouth (oral) or under the arm may be a little lower than your core temperature (rectal). What is a fever temperature? A core temperature of 100.4°F or above is considered a fever. What can cause a fever? A fever may be caused by:  · Infections. This is the most common cause of a fever. Examples of infections that can cause a fever include the flu, a kidney infection, or pneumonia. · Some medicines. · Severe trauma or injury, such as a heart attack, stroke, heatstroke, or burns. · Other medical conditions, such as arthritis and some cancers. How can you treat a fever at home? · Ask your doctor if you can take an over-the-counter pain medicine, such as acetaminophen (Tylenol), ibuprofen (Advil, Motrin), or naproxen (Aleve). Be safe with medicines. Read and follow all instructions on the label.   · To prevent dehydration, drink plenty of fluids. Choose water and other caffeine-free clear liquids until you feel better. If you have kidney, heart, or liver disease and have to limit fluids, talk with your doctor before you increase the amount of fluids you drink. Follow-up care is a key part of your treatment and safety. Be sure to make and go to all appointments, and call your doctor if you are having problems. It's also a good idea to know your test results and keep a list of the medicines you take. Where can you learn more? Go to http://nemo-rachna.info/. Enter S152 in the search box to learn more about \"Learning About Fever. \"  Current as of: September 23, 2018  Content Version: 12.1  © 0556-5242 [employer]. Care instructions adapted under license by [employer] (which disclaims liability or warranty for this information). If you have questions about a medical condition or this instruction, always ask your healthcare professional. [address] any warranty or liability for your use of this information.

## 2019-08-26 NOTE — PROGRESS NOTES
I performed a brief history of the patient here in triage and I have determined that pt will need further treatment and evaluation from the main side ER physician. I have placed initial orders based on the history to help in expediting patients care.         Visit Vitals  BP (!) 135/91 (BP 1 Location: Right arm, BP Patient Position: At rest;Sitting)   Pulse (!) 111   Temp 99.8 °F (37.7 °C)   Resp 16   Ht 5' 10.5\" (1.791 m)   Wt 83.9 kg (185 lb)   SpO2 97%   BMI 26.17 kg/m²       Dore Hodgkins, PA-C

## 2019-08-26 NOTE — ED NOTES
Patient states he is not having an irregular HR, states he was seen for this on Friday, just here for follow up

## 2019-08-26 NOTE — ED PROVIDER NOTES
EMERGENCY DEPARTMENT HISTORY AND PHYSICAL EXAM    11:58 AM      Date: 8/26/2019  Patient Name: Albina Montgomery    History of Presenting Illness     Chief Complaint   Patient presents with    Follow-up         History Provided By: patient    Additional History (Context): Albina Montgomery is a 20 y.o. male presents with follow-up from a visit last Friday, he had fever and back pain. Denies chest pain facial pain such as sinus, excessive congestion cough, abdominal pain vomiting diarrhea, urinary symptoms. He was told to follow-up with PCP or return to the ER if unable to do so he is here just for reevaluation. He still perceives that he has a fever but other than this no symptoms. He still has some back pain. Payton Nagel PCP: Anali Geronimo MD    Chief Complaint:   Duration:    Timing:    Location:   Quality:   Severity:   Modifying Factors:   Associated Symptoms:           Past History     Past Medical History:  Past Medical History:   Diagnosis Date    Hypertension        Past Surgical History:  No past surgical history on file. Family History:  No family history on file. Social History:  Social History     Tobacco Use    Smoking status: Never Smoker    Smokeless tobacco: Never Used   Substance Use Topics    Alcohol use: Not on file    Drug use: Not on file       Allergies:  No Known Allergies      Review of Systems     Review of Systems   Constitutional: Positive for fever. Negative for diaphoresis. HENT: Negative for congestion and sore throat. Eyes: Negative for pain and itching. Respiratory: Negative for cough and shortness of breath. Cardiovascular: Negative for chest pain and palpitations. Gastrointestinal: Negative for abdominal pain and diarrhea. Endocrine: Negative for polydipsia and polyuria. Genitourinary: Negative for dysuria and hematuria. Musculoskeletal: Negative for arthralgias and myalgias. Skin: Negative for rash and wound.    Neurological: Negative for seizures and syncope. Hematological: Does not bruise/bleed easily. Psychiatric/Behavioral: Negative for agitation and hallucinations. Physical Exam       Patient Vitals for the past 12 hrs:   Temp Pulse Resp BP SpO2   08/26/19 1133  99 18 136/89 98 %   08/26/19 0945 99.8 °F (37.7 °C) (!) 111 16 (!) 135/91 97 %       Physical Exam   Constitutional: He appears well-developed and well-nourished. HENT:   Head: Normocephalic and atraumatic. Mouth/Throat: Oropharynx is clear and moist.   Eyes: Conjunctivae are normal. No scleral icterus. Neck: Normal range of motion. Neck supple. No JVD present. Cardiovascular: Normal rate, regular rhythm and normal heart sounds. 4 intact extremity pulses   Pulmonary/Chest: Effort normal and breath sounds normal.   Abdominal: Soft. He exhibits no mass. There is no tenderness. Musculoskeletal: Normal range of motion. No CVA tenderness   Lymphadenopathy:     He has no cervical adenopathy. Neurological: He is alert. Skin: Skin is warm and dry. Nursing note and vitals reviewed. Diagnostic Study Results   Labs -  Recent Results (from the past 12 hour(s))   EKG, 12 LEAD, INITIAL    Collection Time: 08/26/19 11:36 AM   Result Value Ref Range    Ventricular Rate 96 BPM    Atrial Rate 96 BPM    P-R Interval 140 ms    QRS Duration 86 ms    Q-T Interval 342 ms    QTC Calculation (Bezet) 432 ms    Calculated P Axis 57 degrees    Calculated R Axis 48 degrees    Calculated T Axis 41 degrees    Diagnosis       Normal sinus rhythm  Normal ECG  When compared with ECG of 23-AUG-2019 17:51,  No significant change was found         Radiologic Studies -   No orders to display     No results found. Medications ordered:   Medications - No data to display      Medical Decision Making   Initial Medical Decision Making and DDx:  Labs benign. He did want to review his hepatitis and HIV so we did review this and is all negative. I did encourage him to get a hep B immunization. For his fever, rest hydration Tylenol ibuprofen, primary care follow-up. Return here for new or alarming symptoms he is happy with this plan and ready for discharge. ED Course: Progress Notes, Reevaluation, and Consults:         I am the first provider for this patient. I reviewed the vital signs, available nursing notes, past medical history, past surgical history, family history and social history. Patient Vitals for the past 12 hrs:   Temp Pulse Resp BP SpO2   08/26/19 1133  99 18 136/89 98 %   08/26/19 0945 99.8 °F (37.7 °C) (!) 111 16 (!) 135/91 97 %       Vital Signs-Reviewed the patient's vital signs. Pulse Oximetry Analysis, Cardiac Monitor, 12 lead ekg:      Interpreted by the EP. Records Reviewed: Nursing notes reviewed (Time of Review: 11:58 AM)    Procedures:   Critical Care Time:   Aspirin: (was aspirin given for stroke?)    Diagnosis     Clinical Impression:   1.  Fever, unspecified fever cause        Disposition: Discharged      Follow-up Information     Follow up With Specialties Details Why Contact Info    Tuan Padron MD [facility] In 2 days  [address]  [phone]             Patient's Medications    No medications on file     _______________________________    Notes:    Iban Dale MD using Dragon dictation      _______________________________

## 2019-08-27 LAB
ATRIAL RATE: 96 BPM
CALCULATED P AXIS, ECG09: 57 DEGREES
CALCULATED R AXIS, ECG10: 48 DEGREES
CALCULATED T AXIS, ECG11: 41 DEGREES
DIAGNOSIS, 93000: NORMAL
P-R INTERVAL, ECG05: 140 MS
Q-T INTERVAL, ECG07: 342 MS
QRS DURATION, ECG06: 86 MS
QTC CALCULATION (BEZET), ECG08: 432 MS
VENTRICULAR RATE, ECG03: 96 BPM